# Patient Record
(demographics unavailable — no encounter records)

---

## 2025-07-09 NOTE — HISTORY OF PRESENT ILLNESS
[de-identified] : bump in throat [FreeTextEntry6] : 6 year old with "bump" in throat in AM on occasion for past 1 month used occasional Flonase no cough no fever no dysphagia no emesis some nasal congestion  also, mom requests thyroid testing child has no s/s attributable to thyroid dysfunction

## 2025-07-09 NOTE — DISCUSSION/SUMMARY
[FreeTextEntry1] : possible rhinitis do Flonase daily Zyrtec at bedtime RTO PRN advised on signs and symptoms requiring re evaluation and concern.  re TFT - done at parental request

## 2025-07-16 NOTE — HISTORY OF PRESENT ILLNESS
[de-identified] : Nicki Garcia is a  6-year-old female who was brought in by her father due to a medical concern regarding testing for lidocaine. She is scheduled to have a cavity filled, and her mother requested the test because of a family history of allergies to lidocaine, including her grandmother.